# Patient Record
Sex: FEMALE | Race: ASIAN | NOT HISPANIC OR LATINO | URBAN - METROPOLITAN AREA
[De-identification: names, ages, dates, MRNs, and addresses within clinical notes are randomized per-mention and may not be internally consistent; named-entity substitution may affect disease eponyms.]

---

## 2022-07-02 ENCOUNTER — EMERGENCY (EMERGENCY)
Facility: HOSPITAL | Age: 77
LOS: 1 days | Discharge: HOME | End: 2022-07-02
Attending: EMERGENCY MEDICINE | Admitting: EMERGENCY MEDICINE

## 2022-07-02 VITALS
RESPIRATION RATE: 16 BRPM | DIASTOLIC BLOOD PRESSURE: 67 MMHG | HEART RATE: 108 BPM | OXYGEN SATURATION: 99 % | SYSTOLIC BLOOD PRESSURE: 119 MMHG

## 2022-07-02 VITALS — TEMPERATURE: 97 F

## 2022-07-02 LAB
ALBUMIN SERPL ELPH-MCNC: 4.2 G/DL — SIGNIFICANT CHANGE UP (ref 3.3–5)
ALP SERPL-CCNC: 72 U/L — SIGNIFICANT CHANGE UP (ref 40–120)
ALT FLD-CCNC: 20 U/L — SIGNIFICANT CHANGE UP (ref 4–33)
ANION GAP SERPL CALC-SCNC: 17 MMOL/L — HIGH (ref 7–14)
AST SERPL-CCNC: 33 U/L — HIGH (ref 4–32)
BASOPHILS # BLD AUTO: 0.02 K/UL — SIGNIFICANT CHANGE UP (ref 0–0.2)
BASOPHILS NFR BLD AUTO: 0.3 % — SIGNIFICANT CHANGE UP (ref 0–2)
BILIRUB SERPL-MCNC: 0.3 MG/DL — SIGNIFICANT CHANGE UP (ref 0.2–1.2)
BUN SERPL-MCNC: 28 MG/DL — HIGH (ref 7–23)
CALCIUM SERPL-MCNC: 9.4 MG/DL — SIGNIFICANT CHANGE UP (ref 8.4–10.5)
CHLORIDE SERPL-SCNC: 100 MMOL/L — SIGNIFICANT CHANGE UP (ref 98–107)
CO2 SERPL-SCNC: 21 MMOL/L — LOW (ref 22–31)
CREAT SERPL-MCNC: 1.39 MG/DL — HIGH (ref 0.5–1.3)
EGFR: 39 ML/MIN/1.73M2 — LOW
EOSINOPHIL # BLD AUTO: 0.06 K/UL — SIGNIFICANT CHANGE UP (ref 0–0.5)
EOSINOPHIL NFR BLD AUTO: 0.8 % — SIGNIFICANT CHANGE UP (ref 0–6)
FLUAV AG NPH QL: SIGNIFICANT CHANGE UP
FLUBV AG NPH QL: SIGNIFICANT CHANGE UP
GLUCOSE SERPL-MCNC: 139 MG/DL — HIGH (ref 70–99)
HCT VFR BLD CALC: 39.9 % — SIGNIFICANT CHANGE UP (ref 34.5–45)
HGB BLD-MCNC: 13.3 G/DL — SIGNIFICANT CHANGE UP (ref 11.5–15.5)
IANC: 4.29 K/UL — SIGNIFICANT CHANGE UP (ref 1.8–7.4)
IMM GRANULOCYTES NFR BLD AUTO: 0.3 % — SIGNIFICANT CHANGE UP (ref 0–1.5)
LYMPHOCYTES # BLD AUTO: 2.54 K/UL — SIGNIFICANT CHANGE UP (ref 1–3.3)
LYMPHOCYTES # BLD AUTO: 34.1 % — SIGNIFICANT CHANGE UP (ref 13–44)
MCHC RBC-ENTMCNC: 28.7 PG — SIGNIFICANT CHANGE UP (ref 27–34)
MCHC RBC-ENTMCNC: 33.3 GM/DL — SIGNIFICANT CHANGE UP (ref 32–36)
MCV RBC AUTO: 86.2 FL — SIGNIFICANT CHANGE UP (ref 80–100)
MONOCYTES # BLD AUTO: 0.51 K/UL — SIGNIFICANT CHANGE UP (ref 0–0.9)
MONOCYTES NFR BLD AUTO: 6.9 % — SIGNIFICANT CHANGE UP (ref 2–14)
NEUTROPHILS # BLD AUTO: 4.29 K/UL — SIGNIFICANT CHANGE UP (ref 1.8–7.4)
NEUTROPHILS NFR BLD AUTO: 57.6 % — SIGNIFICANT CHANGE UP (ref 43–77)
NRBC # BLD: 0 /100 WBCS — SIGNIFICANT CHANGE UP
NRBC # FLD: 0 K/UL — SIGNIFICANT CHANGE UP
PLATELET # BLD AUTO: 198 K/UL — SIGNIFICANT CHANGE UP (ref 150–400)
POTASSIUM SERPL-MCNC: 4.3 MMOL/L — SIGNIFICANT CHANGE UP (ref 3.5–5.3)
POTASSIUM SERPL-SCNC: 4.3 MMOL/L — SIGNIFICANT CHANGE UP (ref 3.5–5.3)
PROT SERPL-MCNC: 8.1 G/DL — SIGNIFICANT CHANGE UP (ref 6–8.3)
RBC # BLD: 4.63 M/UL — SIGNIFICANT CHANGE UP (ref 3.8–5.2)
RBC # FLD: 13.4 % — SIGNIFICANT CHANGE UP (ref 10.3–14.5)
RSV RNA NPH QL NAA+NON-PROBE: DETECTED
SARS-COV-2 RNA SPEC QL NAA+PROBE: SIGNIFICANT CHANGE UP
SODIUM SERPL-SCNC: 138 MMOL/L — SIGNIFICANT CHANGE UP (ref 135–145)
WBC # BLD: 7.44 K/UL — SIGNIFICANT CHANGE UP (ref 3.8–10.5)
WBC # FLD AUTO: 7.44 K/UL — SIGNIFICANT CHANGE UP (ref 3.8–10.5)

## 2022-07-02 PROCEDURE — 73630 X-RAY EXAM OF FOOT: CPT | Mod: 26,LT

## 2022-07-02 PROCEDURE — 72125 CT NECK SPINE W/O DYE: CPT | Mod: 26,MA

## 2022-07-02 PROCEDURE — 71045 X-RAY EXAM CHEST 1 VIEW: CPT | Mod: 26

## 2022-07-02 PROCEDURE — 99284 EMERGENCY DEPT VISIT MOD MDM: CPT | Mod: GC

## 2022-07-02 PROCEDURE — 70486 CT MAXILLOFACIAL W/O DYE: CPT | Mod: 26,MA

## 2022-07-02 PROCEDURE — 70450 CT HEAD/BRAIN W/O DYE: CPT | Mod: 26,MA

## 2022-07-02 NOTE — ED ADULT NURSE NOTE - NSIMPLEMENTINTERV_GEN_ALL_ED
Implemented All Fall Risk Interventions:  [place] to call system. Call bell, personal items and telephone within reach. Instruct patient to call for assistance. Room bathroom lighting operational. Non-slip footwear when patient is off stretcher. Physically safe environment: no spills, clutter or unnecessary equipment. Stretcher in lowest position, wheels locked, appropriate side rails in place. Provide visual cue, wrist band, yellow gown, etc. Monitor gait and stability. Monitor for mental status changes and reorient to person, place, and time. Review medications for side effects contributing to fall risk. Reinforce activity limits and safety measures with patient and family.

## 2022-07-02 NOTE — ED PROVIDER NOTE - ATTENDING CONTRIBUTION TO CARE
alma delia: pt with hx dm, neuropathy with multiple falls, here with toe pain left toe, xray shows toe fracture, has no vibratory sense on right foot, limited on left, known old neuropathy.  recommend walking with a cane to feel ground, podiatry to see pt, encouraged pt she should not cut her toenails anymore but instead have podiatrist do so to eval/ prevent infections.  awaiting a1c to eval if benefit from endo seeing pt in cdu    I performed a history and physical exam of the patient and discussed their management with the resident and /or advanced care provider. I reviewed the resident and /or ACP's note and agree with the documented findings and plan of care. My medical decison making and observations are found above.

## 2022-07-02 NOTE — ED ADULT NURSE NOTE - OBJECTIVE STATEMENT
78 y/o F arrives to E.D. rm 4 c/o weakness, multiple mechanical falls over the last 2 wks, and cough x1 wk. Pt a&ox4, ambulatory w/ assist, neg SOB, denies vision changes, denies CP, neg N/V/D, denies fevers. R 20g IV placed to wrist. Pt endorses hitting head after slip and fall yesterday. Denies LOC with any recent falls. Neg A/C use. Call bell in reach.

## 2022-07-02 NOTE — ED PROVIDER NOTE - CARE PLAN
Principal Discharge DX:	Neuropathy  Secondary Diagnosis:	Toe fracture, left  Secondary Diagnosis:	Diabetes   1

## 2022-07-02 NOTE — ED PROVIDER NOTE - NSFOLLOWUPINSTRUCTIONS_ED_ALL_ED_FT
Discharge instructions:    - Please follow up with your Primary Care Doctor within the next 3-5 days.    - Our discharge lounge will help expedite your Endocrinology follow up.    - We recommend you see a podiatrist; please see clinic information.     - Tylenol up to 650 mg every 8 hours as needed for pain. Take any prescribed medications as instructed:       SEEK IMMEDIATE MEDICAL CARE IF YOU HAVE ANY OF THE FOLLOWING SYMPTOMS: numbness, tingling, increasing pain, or weakness in any part of the injured limb.

## 2022-07-02 NOTE — ED PROVIDER NOTE - PROGRESS NOTE DETAILS
Dariusz, PGY3: Patient reassessed and noting symptomatic improvement. Podiatry consulted for establishing care given neuropathy and fracture phalanx. Patient to follow up with outpatient podiatry clinic and will expedite endocrine follow up. Strict return precautions provided and patient understands and agrees w/ plan.

## 2022-07-02 NOTE — CONSULT NOTE ADULT - SUBJECTIVE AND OBJECTIVE BOX
Patient is a 77y old  Female who presents with a chief complaint of     HPI: 78yo F w/ history of NIDDM, HTN and HLD coming in w/ frequent falls and chronic cough. Patient states she had her first fall 1 week ago when she tripped over her suitcase at night and twice again over the past several days, most recently yesterday when she was going to sit at the bed but misjudged where it was and fell onto her face. No LOC. No AC use. Notes left side forehead pain and left foot pain.      PAST MEDICAL & SURGICAL HISTORY:  Diabetes mellitus      Hypertension          MEDICATIONS  (STANDING):    MEDICATIONS  (PRN):      Allergies    aspirin (Eye Irritation)    Intolerances        VITALS:    Vital Signs Last 24 Hrs  T(C): 36.3 (02 Jul 2022 15:08), Max: 36.3 (02 Jul 2022 15:08)  T(F): 97.4 (02 Jul 2022 15:08), Max: 97.4 (02 Jul 2022 15:08)  HR: 98 (02 Jul 2022 14:18) (98 - 108)  BP: 126/79 (02 Jul 2022 14:18) (119/67 - 126/79)  BP(mean): --  RR: 20 (02 Jul 2022 14:18) (16 - 20)  SpO2: 96% (02 Jul 2022 14:18) (96% - 99%)    LABS:                          13.3   7.44  )-----------( 198      ( 02 Jul 2022 14:05 )             39.9       07-02    138  |  100  |  28<H>  ----------------------------<  139<H>  4.3   |  21<L>  |  1.39<H>    Ca    9.4      02 Jul 2022 14:05    TPro  8.1  /  Alb  4.2  /  TBili  0.3  /  DBili  x   /  AST  33<H>  /  ALT  20  /  AlkPhos  72  07-02      CAPILLARY BLOOD GLUCOSE      POCT Blood Glucose.: 152 mg/dL (02 Jul 2022 12:32)          LOWER EXTREMITY PHYSICAL EXAM:    Vascular: DP/PT 2/4, B/L, CFT <3 seconds B/L, Temperature gradient warm to cool, B/L.   Neuro: Epicritic sensation diminished to the level of toes, B/L.  Musculoskeletal/Ortho: POP of the left foot on the dorsal medial forefoot at the level of the MPJ.  Skin: Ecchymosis noted on the left forefoot along the medial 1st metatarsal and proximal phalanx. Color changes noted on the left dorsal forefoot.       RADIOLOGY & ADDITIONAL STUDIES:

## 2022-07-02 NOTE — ED PROVIDER NOTE - OBJECTIVE STATEMENT
76yo F w/ history of NIDDM, HTN and HLD coming in w/ frequent falls and chronic cough. Patient states she had her first fall 78yo F w/ history of NIDDM, HTN and HLD coming in w/ frequent falls and chronic cough. Patient states she had her first fall 1 week ago when she tripped over her suitcase at night. 76yo F w/ history of NIDDM, HTN and HLD coming in w/ frequent falls and chronic cough. Patient states she had her first fall 1 week ago when she tripped over her suitcase at night and twice again over the past several days, most recently yesterday when she was going to sit at the bed but misjudged where it was and fell onto her face. No LOC. No AC use. Notes left side forehead pain and left foot pain.

## 2022-07-02 NOTE — ED PROVIDER NOTE - CLINICAL SUMMARY MEDICAL DECISION MAKING FREE TEXT BOX
76yo F w/ history of NIDDM, HTN and HLD coming in w/ frequent falls and chronic cough. 78yo F w/ history of NIDDM, HTN and HLD coming in w/ frequent falls and chronic cough; will evaluate for infectious vs metabolic abnormalities and assess for any incracranial pathologies and fractures 78yo F w/ history of NIDDM, HTN and HLD coming in w/ frequent falls and chronic cough; will evaluate for infectious vs metabolic abnormalities and assess for any incracranial pathologies and fractures    alma delia: pt with hx dm, neuropathy with multiple falls, here with toe pain left toe, xray shows toe fracture, has no vibratory sense on right foot, limited on left, known old neuropathy.  recommend walking with a cane to feel ground, podiatry to see pt, encouraged pt she should not cut her toenails anymore but instead have podiatrist do so to eval/ prevent infections.  awaiting a1c to eval if benefit from endo seeing pt in cdu

## 2022-07-02 NOTE — ED PROVIDER NOTE - PHYSICAL EXAMINATION
GENERAL: well appearing in no acute distress, non-toxic appearing  HEAD: normocephalic, atraumatic  HENT: airway intact, neck supple  EYES: normal conjunctiva, EOMI, perrl  CARDIAC: regular rate and rhythm, normal S1S2, no appreciable murmurs, 2+ pulses in UE/LE b/l  PULM: normal breath sounds, clear to ascultation bilaterally, no rales, rhonchi, wheezing  GI: abdomen nondistended, soft, nontender, no guarding, rebound tenderness  NEURO: no focal motor or sensory deficits, CN2-12 intact, normal; normal gait, AAOx3  MSK: no peripheral edema, no calf tenderness b/l  SKIN: well-perfused, extremities warm, no visible rashes

## 2022-07-02 NOTE — ED ADULT NURSE NOTE - NS ED PATIENT SAFETY CONCERN
Writer spoke with patient to advise message below. Patient verbalized understanding and no questions at this time     No

## 2022-07-02 NOTE — CONSULT NOTE ADULT - ASSESSMENT
77F presents with a left foot first toe proximal phalanx fracture.  - Pt was seen and evaluated. Chart was reviewed.  - Afebrile, neurovascular status is intact.  - X-Ray: Left foot minimally displaced first toe proximal phalanx fracture.  - POP of the left foot on the dorsal medial forefoot at the level of the MPJ. Ecchymosis noted on the left forefoot along the medial first metatarsal and proximal phalanx. Color changes noted on the left dorsal forefoot.   - Left foot first and second toes were loreto splinted with a betadine splint. A compressive dressing was applied and a surgical shoe was dispensed.   - Keep all dressings and shoe clean, dry, and intact until follow up visit with Dr. Denver Hughes (office # [phone]).  - Discussed with attending. 77F presents with a left foot first toe proximal phalanx fracture.  - Pt was seen and evaluated. Chart was reviewed.  - Afebrile, neurovascular status is intact.  - X-Ray: Left foot minimally displaced first toe proximal phalanx fracture.  - POP of the left foot on the dorsal medial forefoot at the level of the MPJ. Ecchymosis noted on the left forefoot along the medial first metatarsal and proximal phalanx. Color changes noted on the left dorsal forefoot.   - Left foot first and second toes were loreto splinted with a betadine splint. A compressive dressing was applied and a surgical shoe was dispensed.   - Weightbear as tolerated to the heel.  - Keep all dressings and shoe clean, dry, and intact until follow up visit with Dr. Denver Hughes (office # [phone]).  - Discussed with attending.

## 2022-07-02 NOTE — ED PROVIDER NOTE - PATIENT PORTAL LINK FT
You can access the FollowMyHealth Patient Portal offered by [facility] by registering at the following website: [email or web address]. By joining [facility]’s FollowMyHealth portal, you will also be able to view your health information using other applications (apps) compatible with our system.

## 2022-07-02 NOTE — ED PROVIDER NOTE - CARE PROVIDER_API CALL
Denver Hughes (DPM)  Podiatric Medicine and Surgery  [address] [place] [address]  Phone: [phone]  Fax: [phone]  Follow Up Time: 1-3 Days

## 2022-07-02 NOTE — ED ADULT TRIAGE NOTE - CHIEF COMPLAINT QUOTE
Pt c/o cough x 1 wk and left ft pain s/p slip and fall yesterday. reports she was getting into bed when she fell back. reports hitting head, denies LOC, denies AC use, blurry vision, fever, chills. Currently endorsing dizziness. PMhx: [id number] [id number]